# Patient Record
Sex: MALE
[De-identification: names, ages, dates, MRNs, and addresses within clinical notes are randomized per-mention and may not be internally consistent; named-entity substitution may affect disease eponyms.]

---

## 2023-01-06 PROBLEM — Z00.00 ENCOUNTER FOR PREVENTIVE HEALTH EXAMINATION: Status: ACTIVE | Noted: 2023-01-06

## 2023-02-08 ENCOUNTER — APPOINTMENT (OUTPATIENT)
Dept: NEUROLOGY | Facility: CLINIC | Age: 63
End: 2023-02-08
Payer: COMMERCIAL

## 2023-02-08 VITALS
SYSTOLIC BLOOD PRESSURE: 139 MMHG | HEIGHT: 72 IN | BODY MASS INDEX: 35.21 KG/M2 | WEIGHT: 260 LBS | HEART RATE: 80 BPM | DIASTOLIC BLOOD PRESSURE: 88 MMHG

## 2023-02-08 PROCEDURE — 99213 OFFICE O/P EST LOW 20 MIN: CPT

## 2023-02-08 PROCEDURE — 99212 OFFICE O/P EST SF 10 MIN: CPT

## 2023-02-08 NOTE — ASSESSMENT
[FreeTextEntry1] : letter given for clearance, \par \par \par \par I, Lindsey Hussein, Attest that this documentation has been prepared under the direction and in the presence of Provider Donnell Navarrete DO\par \par Thank You for letting me assist in the management of this patient. \par \par Donnell Navarrete DO\essie Board Certified, Neurology\par

## 2023-02-08 NOTE — HISTORY OF PRESENT ILLNESS
[FreeTextEntry1] : Mr. Reinier Fernandez returns to the office for follow-up and his prior history and physical have been reviewed and he reports no change since last visit.  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  He has not had any seizures since but his job require him coming back from time to time.  No complaints

## 2023-02-14 ENCOUNTER — APPOINTMENT (OUTPATIENT)
Dept: NEUROLOGY | Facility: CLINIC | Age: 63
End: 2023-02-14

## 2023-07-10 ENCOUNTER — NON-APPOINTMENT (OUTPATIENT)
Age: 63
End: 2023-07-10

## 2023-07-10 ENCOUNTER — APPOINTMENT (OUTPATIENT)
Dept: ORTHOPEDIC SURGERY | Facility: CLINIC | Age: 63
End: 2023-07-10
Payer: COMMERCIAL

## 2023-07-10 VITALS — HEIGHT: 72 IN | BODY MASS INDEX: 33.86 KG/M2 | WEIGHT: 250 LBS

## 2023-07-10 DIAGNOSIS — S43.005A UNSPECIFIED DISLOCATION OF LEFT SHOULDER JOINT, INITIAL ENCOUNTER: ICD-10-CM

## 2023-07-10 PROCEDURE — 99203 OFFICE O/P NEW LOW 30 MIN: CPT

## 2023-07-10 PROCEDURE — 73030 X-RAY EXAM OF SHOULDER: CPT | Mod: LT

## 2023-07-10 PROCEDURE — L3670: CPT | Mod: LT

## 2023-07-10 NOTE — DATA REVIEWED
[FreeTextEntry1] : X-ray left shoulder: No acute fractures, subluxations, dislocations.  AC joint osteoarthritic changes.

## 2023-07-10 NOTE — DISCUSSION/SUMMARY
[de-identified] : Discussed in detail with patient that he has had a shoulder dislocation.  Discussed with patient that with dislocations it is very common to see rotator cuff injuries/labral tears.  MRI ordered for further evaluation.  Call 2 to 3 days after MRI discuss results in detail.  Placed patient in sling.  Patient should wear sling at all times.  Patient will follow-up with sports department in 2 weeks.  Possibility of discontinuing sling at that time and starting physical therapy.  Patient will take over-the-counter Motrin/Tylenol.  Call if any questions or concerns.  Patient understands agrees with plan.
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
## 2023-07-10 NOTE — PHYSICAL EXAM
[Left] : left shoulder [3 ___] : forward flexion 3[unfilled]/5 [3___] : internal rotation 3[unfilled]/5 [] : motor and sensory intact distally [FreeTextEntry8] : Mild tenderness [FreeTextEntry9] : Moderate decreased range of motion secondary to pain [de-identified] : Positive Jobes

## 2023-07-19 ENCOUNTER — APPOINTMENT (OUTPATIENT)
Dept: MRI IMAGING | Facility: CLINIC | Age: 63
End: 2023-07-19
Payer: COMMERCIAL

## 2023-07-19 PROCEDURE — 73221 MRI JOINT UPR EXTREM W/O DYE: CPT | Mod: LT

## 2023-07-24 ENCOUNTER — APPOINTMENT (OUTPATIENT)
Dept: ORTHOPEDIC SURGERY | Facility: CLINIC | Age: 63
End: 2023-07-24
Payer: COMMERCIAL

## 2023-07-24 PROCEDURE — 99213 OFFICE O/P EST LOW 20 MIN: CPT
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
## 2023-09-07 ENCOUNTER — APPOINTMENT (OUTPATIENT)
Dept: ORTHOPEDIC SURGERY | Facility: CLINIC | Age: 63
End: 2023-09-07
Payer: COMMERCIAL

## 2023-09-07 ENCOUNTER — NON-APPOINTMENT (OUTPATIENT)
Age: 63
End: 2023-09-07

## 2023-09-07 DIAGNOSIS — M25.312 OTHER INSTABILITY, LEFT SHOULDER: ICD-10-CM

## 2023-09-07 PROCEDURE — 99213 OFFICE O/P EST LOW 20 MIN: CPT

## 2023-09-07 NOTE — HISTORY OF PRESENT ILLNESS
[de-identified] : Patient is a 62-year-old male who reports to the office for subsequent reevaluation of his left shoulder pain.  He states that his pain has improved significantly.  His ROM and strength have been improving with formal and at home PT.  He is left-hand dominant.  Denies any numbness or tingling.

## 2023-09-07 NOTE — DISCUSSION/SUMMARY
[de-identified] : Patient's ROM and strength have improved significantly with formal and at-home PT.  He will continue that as directed.  The shoulder conditioning program from the [facility] was given to the patient so they may try that at home.  Take OTC Motrin or Tylenol as needed for pain.  Patient may return back to work full duty without restrictions as of Monday, 9/11/2023.  He will follow-up on as-needed basis.  All of patient's questions/concerns were answered in detail.

## 2023-09-07 NOTE — PHYSICAL EXAM
[Left] : left shoulder [Sitting] : sitting [] : motor and sensory intact distally [de-identified] : There is decent strength [de-identified] : Minimal discomfort with [place]'s testing [TWNoteComboBox7] : active forward flexion 180 degrees [de-identified] : active abduction 175 degrees [TWNoteComboBox9] : passive abduction 180 degrees [TWNoteComboBox6] : internal rotation L1 [de-identified] : external rotation 90 degrees

## 2023-09-15 ENCOUNTER — NON-APPOINTMENT (OUTPATIENT)
Age: 63
End: 2023-09-15

## 2024-01-05 ENCOUNTER — APPOINTMENT (OUTPATIENT)
Dept: ORTHOPEDIC SURGERY | Facility: CLINIC | Age: 64
End: 2024-01-05

## 2024-01-25 ENCOUNTER — APPOINTMENT (OUTPATIENT)
Dept: NEUROLOGY | Facility: CLINIC | Age: 64
End: 2024-01-25
Payer: COMMERCIAL

## 2024-01-25 VITALS — SYSTOLIC BLOOD PRESSURE: 129 MMHG | DIASTOLIC BLOOD PRESSURE: 76 MMHG | TEMPERATURE: 80 F

## 2024-01-25 DIAGNOSIS — Z86.69 PERSONAL HISTORY OF OTHER DISEASES OF THE NERVOUS SYSTEM AND SENSE ORGANS: ICD-10-CM

## 2024-01-25 PROCEDURE — 99212 OFFICE O/P EST SF 10 MIN: CPT

## 2025-01-21 ENCOUNTER — APPOINTMENT (OUTPATIENT)
Dept: NEUROLOGY | Facility: CLINIC | Age: 65
End: 2025-01-21
Payer: COMMERCIAL

## 2025-01-21 VITALS
SYSTOLIC BLOOD PRESSURE: 157 MMHG | DIASTOLIC BLOOD PRESSURE: 77 MMHG | HEART RATE: 64 BPM | BODY MASS INDEX: 32.51 KG/M2 | WEIGHT: 240 LBS | HEIGHT: 72 IN

## 2025-01-21 DIAGNOSIS — Z86.69 PERSONAL HISTORY OF OTHER DISEASES OF THE NERVOUS SYSTEM AND SENSE ORGANS: ICD-10-CM

## 2025-01-21 PROCEDURE — 99212 OFFICE O/P EST SF 10 MIN: CPT

## 2025-01-21 RX ORDER — ROSUVASTATIN CALCIUM 5 MG/1
TABLET, FILM COATED ORAL
Refills: 0 | Status: ACTIVE | COMMUNITY

## 2025-01-21 RX ORDER — LOSARTAN POTASSIUM 100 MG/1
TABLET, FILM COATED ORAL
Refills: 0 | Status: ACTIVE | COMMUNITY